# Patient Record
Sex: FEMALE | Race: AMERICAN INDIAN OR ALASKA NATIVE
[De-identification: names, ages, dates, MRNs, and addresses within clinical notes are randomized per-mention and may not be internally consistent; named-entity substitution may affect disease eponyms.]

---

## 2021-08-02 ENCOUNTER — HOSPITAL ENCOUNTER (INPATIENT)
Dept: HOSPITAL 5 - ED | Age: 41
LOS: 3 days | Discharge: HOME | DRG: 638 | End: 2021-08-05
Attending: INTERNAL MEDICINE | Admitting: INTERNAL MEDICINE
Payer: COMMERCIAL

## 2021-08-02 DIAGNOSIS — F17.200: ICD-10-CM

## 2021-08-02 DIAGNOSIS — E11.65: Primary | ICD-10-CM

## 2021-08-02 DIAGNOSIS — E66.9: ICD-10-CM

## 2021-08-02 DIAGNOSIS — Z91.013: ICD-10-CM

## 2021-08-02 DIAGNOSIS — E87.1: ICD-10-CM

## 2021-08-02 PROCEDURE — 83036 HEMOGLOBIN GLYCOSYLATED A1C: CPT

## 2021-08-02 PROCEDURE — 80074 ACUTE HEPATITIS PANEL: CPT

## 2021-08-02 PROCEDURE — 36415 COLL VENOUS BLD VENIPUNCTURE: CPT

## 2021-08-02 PROCEDURE — 82962 GLUCOSE BLOOD TEST: CPT

## 2021-08-02 PROCEDURE — 85025 COMPLETE CBC W/AUTO DIFF WBC: CPT

## 2021-08-02 PROCEDURE — 82805 BLOOD GASES W/O2 SATURATION: CPT

## 2021-08-02 PROCEDURE — 81001 URINALYSIS AUTO W/SCOPE: CPT

## 2021-08-02 PROCEDURE — 80048 BASIC METABOLIC PNL TOTAL CA: CPT

## 2021-08-02 PROCEDURE — 84100 ASSAY OF PHOSPHORUS: CPT

## 2021-08-02 PROCEDURE — 99284 EMERGENCY DEPT VISIT MOD MDM: CPT

## 2021-08-02 PROCEDURE — 76705 ECHO EXAM OF ABDOMEN: CPT

## 2021-08-02 PROCEDURE — 83735 ASSAY OF MAGNESIUM: CPT

## 2021-08-02 PROCEDURE — 85007 BL SMEAR W/DIFF WBC COUNT: CPT

## 2021-08-02 PROCEDURE — 80053 COMPREHEN METABOLIC PANEL: CPT

## 2021-08-03 LAB
ALBUMIN SERPL-MCNC: 4.7 G/DL (ref 3.9–5)
ALT SERPL-CCNC: 61 UNITS/L (ref 7–56)
BASOPHILS # (AUTO): 0.1 K/MM3 (ref 0–0.1)
BASOPHILS NFR BLD AUTO: 0.8 % (ref 0–1.8)
BILIRUB UR QL STRIP: (no result)
BLOOD UR QL VISUAL: (no result)
BUN SERPL-MCNC: 11 MG/DL (ref 7–17)
BUN SERPL-MCNC: 12 MG/DL (ref 7–17)
BUN SERPL-MCNC: 14 MG/DL (ref 7–17)
BUN SERPL-MCNC: 17 MG/DL (ref 7–17)
BUN SERPL-MCNC: 19 MG/DL (ref 7–17)
BUN/CREAT SERPL: 16 %
BUN/CREAT SERPL: 17 %
BUN/CREAT SERPL: 17 %
BUN/CREAT SERPL: 18 %
BUN/CREAT SERPL: 19 %
CALCIUM SERPL-MCNC: 10.3 MG/DL (ref 8.4–10.2)
CALCIUM SERPL-MCNC: 8.7 MG/DL (ref 8.4–10.2)
CALCIUM SERPL-MCNC: 8.9 MG/DL (ref 8.4–10.2)
CALCIUM SERPL-MCNC: 9.3 MG/DL (ref 8.4–10.2)
CALCIUM SERPL-MCNC: 9.6 MG/DL (ref 8.4–10.2)
EOSINOPHIL # BLD AUTO: 0.1 K/MM3 (ref 0–0.4)
EOSINOPHIL NFR BLD AUTO: 1.2 % (ref 0–4.3)
HCT VFR BLD CALC: 42 % (ref 30.3–42.9)
HEMOLYSIS INDEX: 0
HEMOLYSIS INDEX: 15
HEMOLYSIS INDEX: 2
HEMOLYSIS INDEX: 3
HEMOLYSIS INDEX: 45
HGB BLD-MCNC: 13.8 GM/DL (ref 10.1–14.3)
LYMPHOCYTES # BLD AUTO: 3.3 K/MM3 (ref 1.2–5.4)
LYMPHOCYTES NFR BLD AUTO: 40.8 % (ref 13.4–35)
MCHC RBC AUTO-ENTMCNC: 33 % (ref 30–34)
MCV RBC AUTO: 92 FL (ref 79–97)
MONOCYTES # (AUTO): 0.6 K/MM3 (ref 0–0.8)
MONOCYTES % (AUTO): 8 % (ref 0–7.3)
MUCOUS THREADS #/AREA URNS HPF: (no result) /HPF
PH UR STRIP: 5 [PH] (ref 5–7)
PLATELET # BLD: 278 K/MM3 (ref 140–440)
PROT UR STRIP-MCNC: (no result) MG/DL
RBC # BLD AUTO: 4.57 M/MM3 (ref 3.65–5.03)
RBC #/AREA URNS HPF: 1 /HPF (ref 0–6)
UROBILINOGEN UR-MCNC: < 2 MG/DL (ref ?–2)
WBC #/AREA URNS HPF: < 1 /HPF (ref 0–6)

## 2021-08-03 RX ADMIN — INSULIN HUMAN SCH UNITS: 100 INJECTION, SOLUTION PARENTERAL at 17:46

## 2021-08-03 RX ADMIN — INSULIN HUMAN SCH UNITS: 100 INJECTION, SOLUTION PARENTERAL at 06:01

## 2021-08-03 RX ADMIN — FAMOTIDINE SCH MG: 10 INJECTION, SOLUTION INTRAVENOUS at 21:58

## 2021-08-03 RX ADMIN — HEPARIN SODIUM SCH UNIT: 5000 INJECTION, SOLUTION INTRAVENOUS; SUBCUTANEOUS at 16:00

## 2021-08-03 RX ADMIN — Medication SCH ML: at 22:00

## 2021-08-03 RX ADMIN — Medication SCH ML: at 11:25

## 2021-08-03 RX ADMIN — INSULIN HUMAN SCH UNITS: 100 INJECTION, SOLUTION PARENTERAL at 17:41

## 2021-08-03 RX ADMIN — INSULIN HUMAN SCH UNITS: 100 INJECTION, SOLUTION PARENTERAL at 11:25

## 2021-08-03 RX ADMIN — FAMOTIDINE SCH MG: 10 INJECTION, SOLUTION INTRAVENOUS at 11:25

## 2021-08-03 RX ADMIN — INSULIN HUMAN SCH UNITS: 100 INJECTION, SOLUTION PARENTERAL at 21:59

## 2021-08-03 RX ADMIN — HEPARIN SODIUM SCH UNIT: 5000 INJECTION, SOLUTION INTRAVENOUS; SUBCUTANEOUS at 07:42

## 2021-08-03 RX ADMIN — HEPARIN SODIUM SCH UNIT: 5000 INJECTION, SOLUTION INTRAVENOUS; SUBCUTANEOUS at 21:58

## 2021-08-03 NOTE — HISTORY AND PHYSICAL REPORT
History of Present Illness


Date of examination: 08/03/21


Date of admission: 


08/03/21 04:08





Chief complaint: 





Abdominal pain


increased taste


Urine frequency


History of present illness: 





Ms. Son is a 41 years old female who came to ED with chief complaint of 

generalized weakness, nausea, abdominal pain, increased thirst with urine 

frequency.  She said her symptom has been ongoing and she did not know why.  She

came to the ED when her symptoms has worsened.  In ED her blood sugar was 833.  

2 L of IV fluid given bolus.  Blood sugar rechecked post IV bolus 556.  Patient 

denies any significant past medical history.  Patient alert and oriented at the 

time of assessment.  She denies fever, chills, or cough. We will continue IV 

hydration and monitor patient blood sugar with sliding scale.





Past History


Past Medical History: diabetes


Past Surgical History: No surgical history


Social history: lives with family (Lives with her children), smoking (Quit 2 

years ago.  Smoked 1 pack every 3 days)


Family history: no significant family history





Medications and Allergies


                                    Allergies











Allergy/AdvReac Type Severity Reaction Status Date / Time


 


shrimp Allergy  Unknown Uncoded 08/02/21 21:44














Review of Systems


Constitutional: fatigue, weakness, poor appetite


Ears, nose, mouth and throat: no epistaxis, no bleeding gums, no dental pain


Respiratory: no cough, no wheezing


Gastrointestinal: no BRBPR, no melena


Genitourinary Female: no dyspareunia


Rectal: no itching, no hemorrhoids


Integumentary: no rash, no pruritis


Neurological: no head injury, no vertigo, no headaches


Psychiatric: anxiety


Endocrine: excessive thirst, polydipsia, polyuria, excessive sweating


Hematologic/Lymphatic: no easy bruising, no easy bleeding


Allergic/Immunologic: no urticaria





Exam





- Constitutional


Vitals: 


                                        











Temp Pulse Resp BP Pulse Ox


 


 98.2 F   81   16   159/87   95 


 


 08/02/21 21:44  08/02/21 21:44  08/02/21 21:44  08/02/21 21:44  08/02/21 21:44











General appearance: Present: mild distress, obese





- EENT


Eyes: Present: PERRL


ENT: hearing intact, clear oral mucosa





- Neck


Neck: Present: supple, normal ROM





- Respiratory


Respiratory effort: normal


Respiratory: bilateral: CTA





- Cardiovascular


Heart Sounds: Present: S1 & S2.  Absent: rub, click





- Extremities


Extremities: pulses symmetrical, No edema


Peripheral Pulses: within normal limits





- Abdominal


General gastrointestinal: Present: soft, non-tender, non-distended, normal bowel

sounds


Female genitourinary: Present: normal





- Integumentary


Integumentary: Present: clear, warm, dry





- Musculoskeletal


Musculoskeletal: gait normal, strength equal bilaterally





- Psychiatric


Psychiatric: appropriate mood/affect, intact judgment & insight, cooperative





- Neurologic


Neurologic: CNII-XII intact, moves all extremities





- Allied Health


Allied health notes reviewed: nursing





Results





- Labs


CBC & Chem 7: 


                                 08/02/21 23:34





                                 08/02/21 23:34


Labs: 


                              Abnormal lab results











  08/02/21 08/02/21 08/03/21 Range/Units





  23:34 23:34 04:33 


 


RDW  12.8 L    (13.2-15.2)  %


 


Lymph % (Auto)  40.8 H    (13.4-35.0)  %


 


Mono % (Auto)  8.0 H    (0.0-7.3)  %


 


Sodium   123 L   (137-145)  mmol/L


 


Potassium   5.1 H   (3.6-5.0)  mmol/L


 


Chloride   82.0 L   ()  mmol/L


 


BUN   19 H   (7-17)  mg/dL


 


Glucose   833 H*   ()  mg/dL


 


POC Glucose    556 H  ()  mg/dL


 


Calcium   10.3 H   (8.4-10.2)  mg/dL


 


ALT   61 H   (7-56)  units/L














Assessment and Plan





- Patient Problems


(1) Acute hyperglycemia


Current Visit: Yes   Status: Acute   


Plan to address problem: 


Patient is status post 2 L IV bolus in ED


Monitor blood sugar every 4 hours with sliding scale


Continue IV hydration normal saline at 150cc continuously


Blood sugar 833 in ED, rechecked after 2 L bolus and insulin 8 unitsblood sugar

556


Patient alert oriented x3.  Patient not in DKAstable and calm


Check hemoglobin A1c








(2) Hyponatremia


Current Visit: Yes   Status: Acute   


Plan to address problem: 


Likely secondary to dehydration from hyperglycemia


Continue IV hydration


Monitor sodium level








(3) Obesity


Current Visit: Yes   Status: Acute   


Plan to address problem: 


Discussed healthy diet


Discussed eating more fruits and vegetable and avoiding food rich in concen

trated sweeteners


Diabetic educator/nutritionist consulted








(4) DVT prophylaxis


Current Visit: Yes   Status: Acute   


Plan to address problem: 


.  Subcutaneous heparin

## 2021-08-03 NOTE — EVENT NOTE
Date: 08/03/21





Patient 41-year-old presented with generalized weakness nausea and vomiting 

polyuria polydipsia upon work-up and admission found to have Accu-Chek of 833.  

Patient also had hyponatremia was secondary to the hyperglycemia.  Patient new 

onset diabetes was not in DKA.  Will require insulin at this time.  Otherwise 

patient hemodynamically stable.  Anticipate discharge 1 to 2 days.

## 2021-08-03 NOTE — EMERGENCY DEPARTMENT REPORT
ED General Adult HPI





- General


Chief complaint: Abdominal Pain


Stated complaint: STOMACH BURNING/SOB/DEHYDRATED


Time Seen by Provider: 08/03/21 02:15


Source: patient


Mode of arrival: Ambulatory


Limitations: No Limitations





- History of Present Illness


Initial comments: 





Patient is 41 years old obese female with no significant past medical history.  

Patient presented to the ER complaining of generalized weakness, nausea, diffuse

abdominal pain, thirsty and increased urinary frequency.  Patient stated that 

symptoms started 3 weeks ago but is getting worse now.  Patient denied any fever

or chills.  No cough.


Severity scale (0 -10): 5





- Related Data


                                    Allergies











Allergy/AdvReac Type Severity Reaction Status Date / Time


 


shrimp Allergy  Unknown Uncoded 08/02/21 21:44














ED Review of Systems


ROS: 


Stated complaint: STOMACH BURNING/SOB/DEHYDRATED


Other details as noted in HPI





Comment: All other systems reviewed and negative


Constitutional: denies: chills, fever


Respiratory: denies: cough, shortness of breath, SOB with exertion, SOB at rest


Cardiovascular: denies: chest pain, palpitations


Endocrine: increased hunger, increased thirst, increased urine


Gastrointestinal: abdominal pain, nausea.  denies: vomiting, diarrhea, 

constipation, hematemesis, melena, hematochezia


Genitourinary: frequency.  denies: urgency, dysuria


Musculoskeletal: denies: back pain


Neurological: weakness.  denies: headache, numbness, paresthesias, confusion, 

abnormal gait





ED Past Medical Hx





- Past Medical History


Previous Medical History?: No





- Surgical History


Past Surgical History?: Yes


Additional Surgical History: R arm sx, c-sectionx1





ED Physical Exam





- General


Limitations: No Limitations


General appearance: alert, in no apparent distress





- Head


Head exam: Present: atraumatic, normocephalic, normal inspection





- Eye


Eye exam: Present: normal appearance, PERRL





- ENT


ENT exam: Present: mucous membranes dry





- Neck


Neck exam: Present: normal inspection, full ROM.  Absent: tenderness, 

meningismus





- Respiratory


Respiratory exam: Present: normal lung sounds bilaterally





- Cardiovascular


Cardiovascular Exam: Present: regular rate, normal rhythm, normal heart sounds





- GI/Abdominal


GI/Abdominal exam: Present: soft, normal bowel sounds.  Absent: distended, 

tenderness, guarding, rebound, rigid, organomegaly, mass, bruit, pulsatile mass,

hernia





- Extremities Exam


Extremities exam: Present: normal inspection, full ROM, normal capillary refill.

 Absent: tenderness, pedal edema, joint swelling, calf tenderness





- Back Exam


Back exam: Present: normal inspection, full ROM.  Absent: CVA tenderness (R), 

CVA tenderness (L)





- Neurological Exam


Neurological exam: Present: alert, oriented X3, CN II-XII intact, normal gait, 

reflexes normal.  Absent: motor sensory deficit





- Psychiatric


Psychiatric exam: Present: normal mood





- Skin


Skin exam: Present: warm, dry, intact





ED Course


                                   Vital Signs











  08/02/21





  21:44


 


Temperature 98.2 F


 


Pulse Rate 81


 


Respiratory 16





Rate 


 


Blood Pressure 159/87





[Right] 


 


O2 Sat by Pulse 95





Oximetry 














ED Medical Decision Making





- Lab Data


Result diagrams: 


                                 08/02/21 23:34





                                 08/02/21 23:34





- Medical Decision Making





Patient is 41 years old obese female with no significant past medical history.  

Patient presented to the ER complaining of generalized weakness, nausea, diffuse

 abdominal pain, thirsty and increased urinary frequency.  Patient stated that 

symptoms started 3 weeks ago but is getting worse now.  Patient denied any fever

 or chills.  No cough.





Labs reviewed and showed blood glucose of 888.  Anion gap of 22.  Patient 

started on normal saline and insulin.  Urine is unremarkable for infection.  I 

discussed the patient with Dr. Espinoza, he agreed to admit the patient to medical

 service for further management.


Critical Care Time: Yes


Critical care time in (mins) excluding proc time.: 30


Critical care attestation.: 


If time is entered above; I have spent that time in minutes in the direct care 

of this critically ill patient, excluding procedure time.








ED Disposition


Clinical Impression: 


 Acute hyperglycemia





Disposition: DC-09 OP ADMIT IP TO THIS HOSP


Is pt being admited?: Yes


Condition: Stable


Instructions:  Abdominal Pain (ED)


Referrals: 


PRIMARY CARE,MD [Primary Care Provider] - 3-5 Days

## 2021-08-04 LAB
ALBUMIN SERPL-MCNC: 4 G/DL (ref 3.9–5)
ALT SERPL-CCNC: 101 UNITS/L (ref 7–56)
BAND NEUTROPHILS # (MANUAL): 0 K/MM3
BUN SERPL-MCNC: 10 MG/DL (ref 7–17)
BUN SERPL-MCNC: 10 MG/DL (ref 7–17)
BUN/CREAT SERPL: 11 %
BUN/CREAT SERPL: 13 %
CALCIUM SERPL-MCNC: 9.1 MG/DL (ref 8.4–10.2)
CALCIUM SERPL-MCNC: 9.3 MG/DL (ref 8.4–10.2)
HCT VFR BLD CALC: 38.6 % (ref 30.3–42.9)
HEMOLYSIS INDEX: 12
HEMOLYSIS INDEX: 7
HGB BLD-MCNC: 13.3 GM/DL (ref 10.1–14.3)
MCHC RBC AUTO-ENTMCNC: 35 % (ref 30–34)
MCV RBC AUTO: 90 FL (ref 79–97)
MYELOCYTES # (MANUAL): 0 K/MM3
PLATELET # BLD: 236 K/MM3 (ref 140–440)
PROMYELOCYTES # (MANUAL): 0 K/MM3
RBC # BLD AUTO: 4.29 M/MM3 (ref 3.65–5.03)
TOTAL CELLS COUNTED BLD: 100

## 2021-08-04 RX ADMIN — INSULIN HUMAN SCH UNITS: 100 INJECTION, SOLUTION PARENTERAL at 12:46

## 2021-08-04 RX ADMIN — INSULIN HUMAN SCH UNITS: 100 INJECTION, SOLUTION PARENTERAL at 07:19

## 2021-08-04 RX ADMIN — INSULIN HUMAN SCH UNITS: 100 INJECTION, SOLUTION PARENTERAL at 17:29

## 2021-08-04 RX ADMIN — HEPARIN SODIUM SCH UNIT: 5000 INJECTION, SOLUTION INTRAVENOUS; SUBCUTANEOUS at 22:06

## 2021-08-04 RX ADMIN — HEPARIN SODIUM SCH UNIT: 5000 INJECTION, SOLUTION INTRAVENOUS; SUBCUTANEOUS at 12:59

## 2021-08-04 RX ADMIN — INSULIN HUMAN SCH UNITS: 100 INJECTION, SOLUTION PARENTERAL at 09:31

## 2021-08-04 RX ADMIN — FAMOTIDINE SCH MG: 20 TABLET ORAL at 22:06

## 2021-08-04 RX ADMIN — FAMOTIDINE SCH MG: 10 INJECTION, SOLUTION INTRAVENOUS at 09:31

## 2021-08-04 RX ADMIN — Medication SCH ML: at 22:07

## 2021-08-04 RX ADMIN — ONDANSETRON PRN MG: 2 INJECTION INTRAMUSCULAR; INTRAVENOUS at 05:28

## 2021-08-04 RX ADMIN — Medication SCH ML: at 09:31

## 2021-08-04 RX ADMIN — INSULIN HUMAN SCH UNIT: 100 INJECTION, SUSPENSION SUBCUTANEOUS at 17:29

## 2021-08-04 RX ADMIN — INSULIN HUMAN SCH UNITS: 100 INJECTION, SOLUTION PARENTERAL at 03:18

## 2021-08-04 RX ADMIN — HEPARIN SODIUM SCH UNIT: 5000 INJECTION, SOLUTION INTRAVENOUS; SUBCUTANEOUS at 05:29

## 2021-08-04 RX ADMIN — INSULIN HUMAN SCH UNITS: 100 INJECTION, SOLUTION PARENTERAL at 22:06

## 2021-08-04 NOTE — PROGRESS NOTE
Assessment and Plan





- Patient Problems


(1) Elevated liver function tests


Current Visit: Yes   Status: Acute   


Plan to address problem: 


Unclear etiology.  Patient did complain of some right-sided flank pain.  Will 

obtain ultrasound gallbladder.  Will repeat LFTs because they were not elevated 

when she came in previously.  Patient was not started on statin.  We will also 

check acute hepatitis panel.








(2) Acute hyperglycemia


Current Visit: Yes   Status: Acute   


Plan to address problem: 


Diabetes currently uncontrolled.  Patient will require regular insulin.  Will 

change insulin to 70/30 insulin 15 units twice daily and titrate accordingly.








(3) Hyponatremia


Current Visit: Yes   Status: Acute   


Plan to address problem: 


Was secondary to hyperglycemia.  Correcting as we correct patient's blood 

glucose.








(4) Obesity


Current Visit: Yes   Status: Acute   


Qualifiers: 


   Body mass index: BMI 37.0-37.9 


Plan to address problem: 


Patient been educated on diet control.  We will also order diabetic education as

well.  Should seek certified weight loss  and possible sleep apnea test 

after discharge.








Subjective


Date of service: 08/04/21


Principal diagnosis: Hypoglycemia, new onset diabetes


Interval history: 





8/4/2021 patient feels better today.  Still feels weak blood sugars uncontrolled

today and low 300s.  Hospital course complicated by elevated liver function test

today.





Objective





- Constitutional


Vitals: 


                               Vital Signs - 12hr











  08/03/21 08/04/21





  23:27 03:00


 


Pulse Rate  77


 


Pulse Rate [ 77 





Right Apical]  


 


Respiratory 18 





Rate  


 


O2 Sat by Pulse 98 





Oximetry  











General appearance: Present: no acute distress, well-nourished





- EENT


Eyes: PERRL, EOM intact


ENT: hearing intact, clear oral mucosa


Ears: bilateral: normal





- Neck


Neck: supple, normal ROM





- Respiratory


Respiratory effort: normal


Respiratory: bilateral: CTA





- Breasts


Breasts: normal





- Cardiovascular


Rhythm: regular


Heart Sounds: Present: S1 & S2.  Absent: gallop, rub


Extremities: pulses intact, No edema, normal color, Full ROM





- Gastrointestinal


General gastrointestinal: Present: soft, non-tender, non-distended, normal bowel

sounds





- Genitourinary


Female genitourinary: normal





- Integumentary


Integumentary: clear, warm, dry





- Musculoskeletal


Musculoskeletal: 1, strength equal bilaterally





- Neurologic


Neurologic: moves all extremities





- Psychiatric


Psychiatric: memory intact, appropriate mood/affect, intact judgment & insight





- Labs


CBC & Chem 7: 


                                 08/04/21 04:35





                                 08/04/21 04:35


Labs: 


                              Abnormal lab results











  08/03/21 08/03/21 08/03/21 Range/Units





  08:58 11:16 12:18 


 


MCHC     (30-34)  %


 


RDW     (13.2-15.2)  %


 


Sodium    136 L  (137-145)  mmol/L


 


Chloride  97.7 L    ()  mmol/L


 


Carbon Dioxide     (22-30)  mmol/L


 


Glucose  385 H   371 H  ()  mg/dL


 


POC Glucose   398 H   ()  mg/dL


 


AST     (5-40)  units/L


 


ALT     (7-56)  units/L














  08/03/21 08/03/21 08/03/21 Range/Units





  16:28 17:09 21:04 


 


MCHC     (30-34)  %


 


RDW     (13.2-15.2)  %


 


Sodium    131 L  (137-145)  mmol/L


 


Chloride    97.4 L  ()  mmol/L


 


Carbon Dioxide    21 L  (22-30)  mmol/L


 


Glucose    288 H  ()  mg/dL


 


POC Glucose  457 H  430 H   ()  mg/dL


 


AST     (5-40)  units/L


 


ALT     (7-56)  units/L














  08/03/21 08/04/21 08/04/21 Range/Units





  21:45 02:18 04:35 


 


MCHC    35 H  (30-34)  %


 


RDW    12.4 L  (13.2-15.2)  %


 


Sodium     (137-145)  mmol/L


 


Chloride     ()  mmol/L


 


Carbon Dioxide     (22-30)  mmol/L


 


Glucose     ()  mg/dL


 


POC Glucose  270 H  383 H   ()  mg/dL


 


AST     (5-40)  units/L


 


ALT     (7-56)  units/L














  08/04/21 08/04/21 08/04/21 Range/Units





  04:35 06:31 07:48 


 


MCHC     (30-34)  %


 


RDW     (13.2-15.2)  %


 


Sodium  132 L    (137-145)  mmol/L


 


Chloride  95.0 L    ()  mmol/L


 


Carbon Dioxide     (22-30)  mmol/L


 


Glucose  346 H    ()  mg/dL


 


POC Glucose   288 H  320 H  ()  mg/dL


 


AST  172 H    (5-40)  units/L


 


ALT  101 H    (7-56)  units/L

## 2021-08-05 VITALS — DIASTOLIC BLOOD PRESSURE: 75 MMHG | SYSTOLIC BLOOD PRESSURE: 132 MMHG

## 2021-08-05 LAB
BAND NEUTROPHILS # (MANUAL): 0 K/MM3
BUN SERPL-MCNC: 8 MG/DL (ref 7–17)
BUN/CREAT SERPL: 11 %
CALCIUM SERPL-MCNC: 9.5 MG/DL (ref 8.4–10.2)
HCT VFR BLD CALC: 39.7 % (ref 30.3–42.9)
HEMOLYSIS INDEX: 14
HGB BLD-MCNC: 13.7 GM/DL (ref 10.1–14.3)
MCHC RBC AUTO-ENTMCNC: 35 % (ref 30–34)
MCV RBC AUTO: 90 FL (ref 79–97)
MYELOCYTES # (MANUAL): 0 K/MM3
PLATELET # BLD: 246 K/MM3 (ref 140–440)
PROMYELOCYTES # (MANUAL): 0 K/MM3
RBC # BLD AUTO: 4.4 M/MM3 (ref 3.65–5.03)
TOTAL CELLS COUNTED BLD: 100

## 2021-08-05 RX ADMIN — INSULIN HUMAN SCH UNITS: 100 INJECTION, SOLUTION PARENTERAL at 07:55

## 2021-08-05 RX ADMIN — INSULIN HUMAN SCH UNIT: 100 INJECTION, SUSPENSION SUBCUTANEOUS at 09:46

## 2021-08-05 RX ADMIN — Medication SCH ML: at 09:48

## 2021-08-05 RX ADMIN — FAMOTIDINE SCH MG: 20 TABLET ORAL at 09:47

## 2021-08-05 RX ADMIN — HEPARIN SODIUM SCH UNIT: 5000 INJECTION, SOLUTION INTRAVENOUS; SUBCUTANEOUS at 06:19

## 2021-08-05 RX ADMIN — INSULIN HUMAN SCH UNITS: 100 INJECTION, SOLUTION PARENTERAL at 12:48

## 2021-08-05 RX ADMIN — ONDANSETRON PRN MG: 2 INJECTION INTRAMUSCULAR; INTRAVENOUS at 06:19

## 2021-08-05 NOTE — DISCHARGE SUMMARY
Providers





- Providers


Date of Admission: 


08/03/21 04:08





Date of discharge: 08/05/21


Attending physician: 


NOY VU





                                        





08/03/21 04:43


Consult to Dietitian/Nutrition [CONS] Routine 


   Physician Instructions: 


   Reason For Exam: acute hyperglycemia


   Reason for Consult: Nutrition Recommendations


   Reason for Consult: diabetes





08/04/21 08:06


Consult to Dietitian/Nutrition [CONS] Routine 


   Physician Instructions: 


   Reason For Exam: 


   Reason for Consult: Diet education











Primary care physician: 


PRIMARY CARE MD








Hospitalization


Reason for admission: New DM, hyperglycemia


Condition: Stable


Hospital course: 








Ms. Son is a 41 years old female who came to ED with chief complaint of 

generalized weakness, nausea, abdominal pain, increased thirst with urine 

frequency.  She said her symptom has been ongoing and she did not know why.  She

 came to the ED when her symptoms has worsened.  In ED her blood sugar was 833. 

 2 L of IV fluid given bolus.  Blood sugar rechecked post IV bolus 556.  Patient

 denies any significant past medical history.  The patient was admitted with 

diagnosis of hyperglycemia and new diagnosis of uncontrolled diabetes mellitus 

type 2.  The patient received IV fluid hydration and insulin 70/30 15 units 

twice daily with improvement in BG.  Patient will also be treated with 

Glucophage at discharge.  Creatinine is stable.  Dedicated discharge time 35 

minutes


Disposition: DC-01 TO HOME OR SELFCARE


Final Discharge Diagnosis (Prints w/discharge instructions): Hyperglycemia, new 

diagnosis diabetes mellitus type 2 and hyponatremia





Core Measure Documentation





- Palliative Care


Palliative Care/ Comfort Measures: Not Applicable





- Core Measures


Any of the following diagnoses?: none





Exam





- Constitutional


Vitals: 


                                        











Temp Pulse Resp BP Pulse Ox


 


 98.2 F   64   20   132/71   98 


 


 08/05/21 08:01  08/05/21 08:23  08/05/21 08:01  08/05/21 08:01  08/05/21 08:01











General appearance: Present: no acute distress, well-nourished





- EENT


Eyes: Present: PERRL


ENT: hearing intact, clear oral mucosa





- Neck


Neck: Present: supple, normal ROM





- Respiratory


Respiratory effort: normal


Respiratory: bilateral: CTA





- Cardiovascular


Heart Sounds: Present: S1 & S2.  Absent: rub, click





- Extremities


Extremities: pulses symmetrical, No edema


Peripheral Pulses: within normal limits





- Abdominal


General gastrointestinal: Present: soft, non-tender, non-distended, normal bowel

 sounds


Female genitourinary: Present: normal





- Integumentary


Integumentary: Present: clear, warm, dry





- Musculoskeletal


Musculoskeletal: gait normal, strength equal bilaterally





- Psychiatric


Psychiatric: appropriate mood/affect, intact judgment & insight





- Neurologic


Neurologic: CNII-XII intact, moves all extremities





Plan


Activity: advance as tolerated


Weight Bearing Status: Weight Bear as Tolerated


Diet: diabetic


Follow up with: 


PRIMARY CARE,MD [Primary Care Provider] - 3-5 Days


Prescriptions: 


metFORMIN [Glucophage] 500 mg PO BID #60 tablet


Insulin NPH/Regular [NovoLIN 70/30] 20 unit SUB-Q BIDDIAB 30 Days  units

## 2021-08-05 NOTE — ULTRASOUND REPORT
ULTRASOUND ABDOMEN, LIMITED (RIGHT UPPER QUADRANT)



INDICATION:

ruq pain.



COMPARISON:

None available.



FINDINGS:

Pancreas: Visualized portion shows no significant abnormality.

Liver: Moderate coarse increased echotexture characteristic for steatosis.

Gallbladder: 1.8 cm calcified gallstone

Bile ducts: Normal. Common Bile Duct measures 3 mm. 



Free fluid: None.

Additional Findings: None.



IMPRESSION:

1. Cholelithiasis.

2. Moderate hepatic steatosis



Signer Name: Sabas Nunez MD 

Signed: 8/5/2021 8:44 AM

Workstation Name: [id number]

## 2022-01-05 ENCOUNTER — HOSPITAL ENCOUNTER (EMERGENCY)
Dept: HOSPITAL 5 - ED | Age: 42
Discharge: HOME | End: 2022-01-05
Payer: COMMERCIAL

## 2022-01-05 VITALS — SYSTOLIC BLOOD PRESSURE: 167 MMHG | DIASTOLIC BLOOD PRESSURE: 102 MMHG

## 2022-01-05 DIAGNOSIS — E11.9: ICD-10-CM

## 2022-01-05 DIAGNOSIS — R30.0: Primary | ICD-10-CM

## 2022-01-05 DIAGNOSIS — Z98.890: ICD-10-CM

## 2022-01-05 DIAGNOSIS — Z87.891: ICD-10-CM

## 2022-01-05 DIAGNOSIS — Z91.013: ICD-10-CM

## 2022-01-05 LAB
BACTERIA #/AREA URNS HPF: (no result) /HPF
BILIRUB UR QL STRIP: (no result)
BLOOD UR QL VISUAL: (no result)
MUCOUS THREADS #/AREA URNS HPF: (no result) /HPF
PH UR STRIP: 5 [PH] (ref 5–7)
PROT UR STRIP-MCNC: (no result) MG/DL
RBC #/AREA URNS HPF: 3 /HPF (ref 0–6)
UROBILINOGEN UR-MCNC: 2 MG/DL (ref ?–2)
WBC #/AREA URNS HPF: 5 /HPF (ref 0–6)

## 2022-01-05 PROCEDURE — 99283 EMERGENCY DEPT VISIT LOW MDM: CPT

## 2022-01-05 PROCEDURE — 81025 URINE PREGNANCY TEST: CPT

## 2022-01-05 PROCEDURE — 81001 URINALYSIS AUTO W/SCOPE: CPT

## 2022-01-05 PROCEDURE — 87086 URINE CULTURE/COLONY COUNT: CPT

## 2022-01-05 NOTE — EMERGENCY DEPARTMENT REPORT
ED Female  HPI





- General


Chief complaint: Urogenital-Female


Stated complaint: BURNS WHEN PEEING


Time Seen by Provider: 01/05/22 14:47


Source: patient


Mode of arrival: Ambulatory


Limitations: No Limitations





- History of Present Illness


Initial comments: 





41-year-old female -American obese female with a past medical history of 

diabetes presents to the ER today with complaints of UTI symptoms.  Patient 

states that symptoms started 4 days ago.  She reports dysuria, urinary frequency

and suprapubic abdominal discomfort.  She denies any urinary urgency, hematuria,

urinary odor or any abnormal vaginal symptoms.  She denies any fever, chills or 

back pain.  She denies any nausea or vomiting.  She denies any history of 

frequent UTIs.  She is not concerned for any STD and denies any new sexual 

partners.  She is not currently on any birth control.  Last menstrual cycle was 

December 26, 2021.


MD Complaint: dysuria


-: days(s) (4)





- Related Data


                                  Previous Rx's











 Medication  Instructions  Recorded  Last Taken  Type


 


Insulin NPH/Regular [NovoLIN 70/30] 20 unit SUB-Q BIDDIAB 30 Days 08/05/21 

Unknown Rx





 units   


 


metFORMIN [Glucophage] 500 mg PO BID #60 tablet 08/05/21 Unknown Rx


 


Fluconazole [Diflucan TAB] 200 mg PO QDAY #2 tablet 01/05/22 Unknown Rx


 


Phenazopyridine [Pyridium] 200 mg PO TID PRN #9 tab 01/05/22 Unknown Rx


 


Sulfamethoxazole/Trimethoprim 1 each PO BID #10 01/05/22 Unknown Rx





[Bactrim DS TAB]    











                                    Allergies











Allergy/AdvReac Type Severity Reaction Status Date / Time


 


shrimp Allergy  Unknown Uncoded 08/02/21 21:44














ED Review of Systems


ROS: 


Stated complaint: BURNS WHEN PEEING


Other details as noted in HPI





Comment: All other systems reviewed and negative


Constitutional: denies: chills, fever


Eyes: denies: eye pain, eye discharge, vision change


ENT: denies: ear pain, throat pain


Respiratory: denies: cough, shortness of breath, SOB with exertion, SOB at rest,

wheezing


Gastrointestinal: abdominal pain.  denies: nausea, vomiting, diarrhea, 

constipation, hematemesis, hematochezia


Genitourinary: denies: urgency, dysuria, frequency, hematuria, discharge, 

abnormal menses, dyspareunia


Skin: denies: rash, lesions, change in color, change in hair/nails, pruritus


Neurological: denies: headache, weakness, numbness, paresthesias, abnormal gait,

vertigo


Psychiatric: denies: anxiety, depression, auditory hallucinations, visual 

hallucinations, homicidal thoughts, suicidal thoughts


Hematological/Lymphatic: denies: easy bleeding, easy bruising, swollen glands





ED Past Medical Hx





- Past Medical History


Previous Medical History?: Yes


Hx Diabetes: Yes





- Surgical History


Additional Surgical History: R arm sx, c-sectionx1





- Social History


Smoking Status: Former Smoker





- Medications


Home Medications: 


                                Home Medications











 Medication  Instructions  Recorded  Confirmed  Last Taken  Type


 


Insulin NPH/Regular [NovoLIN 70/30] 20 unit SUB-Q BIDDIAB 30 Days 08/05/21  

Unknown Rx





 units    


 


metFORMIN [Glucophage] 500 mg PO BID #60 tablet 08/05/21  Unknown Rx


 


Fluconazole [Diflucan TAB] 200 mg PO QDAY #2 tablet 01/05/22  Unknown Rx


 


Phenazopyridine [Pyridium] 200 mg PO TID PRN #9 tab 01/05/22  Unknown Rx


 


Sulfamethoxazole/Trimethoprim 1 each PO BID #10 01/05/22  Unknown Rx





[Bactrim DS TAB]     














ED Physical Exam





- General


Limitations: No Limitations


General appearance: alert, in no apparent distress, obese





- Head


Head exam: Present: atraumatic, normocephalic, normal inspection





- Eye


Eye exam: Present: normal appearance, PERRL, EOMI


Pupils: Present: normal accommodation





- Neck


Neck exam: Present: normal inspection, full ROM.  Absent: meningismus





- Respiratory


Respiratory exam: Present: normal lung sounds bilaterally.  Absent: respiratory 

distress





- Cardiovascular


Cardiovascular Exam: Present: regular rate, normal rhythm, normal heart sounds





- GI/Abdominal


GI/Abdominal exam: Present: soft.  Absent: distended, tenderness, guarding, 

rebound





- Back Exam


Back exam: Present: normal inspection, full ROM.  Absent: CVA tenderness (R), 

CVA tenderness (L)





- Neurological Exam


Neurological exam: Present: alert, oriented X3, CN II-XII intact, normal gait





- Psychiatric


Psychiatric exam: Present: normal affect, normal mood





- Skin


Skin exam: Present: intact





ED Course


                                   Vital Signs











  01/05/22





  14:02


 


Temperature 99.2 F


 


Pulse Rate 94 H


 


Respiratory 16





Rate 


 


Blood Pressure 167/102


 


O2 Sat by Pulse 99





Oximetry 














ED Medical Decision Making





- Medical Decision Making





Urinalysis reviewed-she does have 1+ bacteria but no leukocytes or nitrites and 

only 5 WBCs but patient is symptomatic , and she is a diabetic and she denies 

any abnormal vaginal discharge or concern for STD.  Will treat patient with a 5-

day course of Bactrim, and order a urine culture.  Patient currently resting 

comfortably.  She is not toxic or ill-appearing.  She has a soft nontender 

abdomen.  No CVA tenderness.  No vital signs show that she is hypertensive, but 

otherwise unremarkable.  She has no symptoms related to hypertension at this 

time.  She is neurologically intact with a normal gait.  Discussed results with 

patient.  Discussed treatment plan with patient.  Recommend increasing her water

intake.  Discussed proper control of her diabetes.  She expressed understanding 

of all instructions and agree with plan.  Patient stable at time of discharge.


Critical care attestation.: 


If time is entered above; I have spent that time in minutes in the direct care 

of this critically ill patient, excluding procedure time.








ED Disposition


Clinical Impression: 


 Dysuria





Disposition: 01 HOME / SELF CARE / HOMELESS


Is pt being admited?: No


Does the pt Need Aspirin: No


Condition: Stable


Instructions:  Dysuria


Additional Instructions: 


I recommend take the Bactrim as prescribed for the next 5 days.  Take the 

Pyridium to help with discomfort when urinating.  The pyridium can change your 

urine orange or red.  Drink lots of water.  Return to the ER if your symptoms 

worsens in any way.


Prescriptions: 


Sulfamethoxazole/Trimethoprim [Bactrim DS TAB] 1 each PO BID #10


Fluconazole [Diflucan TAB] 200 mg PO QDAY #2 tablet


Phenazopyridine [Pyridium] 200 mg PO TID PRN #9 tab


 PRN Reason: dysuria


Referrals: 


PRIMARY CARE,MD [Primary Care Provider] - 3-5 Days


Time of Disposition: 16:56